# Patient Record
(demographics unavailable — no encounter records)

---

## 2025-03-17 NOTE — HISTORY OF PRESENT ILLNESS
[Sudden] : sudden [6] : 6 [2] : 2 [Throbbing] : throbbing [Squeezing] : squeezing [Tightness] : tightness [Household chores] : household chores [Leisure] : leisure [Work] : work [Standing] : standing [Walking] : walking [Exercising] : exercising [de-identified] : This is Ms. MARV ANDERS  a 62 year old female who comes in today complaining of right calf pain after playing pickleball on 03/13/25. Pt states putting weight on calf hurts but limping helps. [] : no [FreeTextEntry1] : right calf [FreeTextEntry3] : 03/13/25 [FreeTextEntry5] : Playing b-datum [FreeTextEntry9] : ibuprofen

## 2025-03-17 NOTE — IMAGING
[Right] : right tibia/fibula [There are no fractures, subluxations or dislocations. No significant abnormalities are seen] : There are no fractures, subluxations or dislocations. No significant abnormalities are seen [de-identified] :   ----------------------------------------------------------------------------   Right foot/ankle exam:  Inspection:   (neg) Effusion, + ecchymosis posterior calf/ ankle     Swelling:   (neg) Calf                        (neg) Lateral ankle        (neg) Medial ankle                          (neg) Lateral foot          (neg) Medial foot     (neg) Dorsal foot                       (neg) 1st MTP                (neg) Toes    Deformity:  (neg) Hindfoot varus     (neg) Valgus                      (neg) Pes planus            (neg) Pes cavus                      Other: Range of Motion:    DF: 15   PF: 40   Eversion: 30   Inversion: 30 Tenderness:    (+) Calf                                   (neg)) Tibia       (neg) Syndesmosis    (neg) ATFL/CFL    (neg) Deltoid ligament    (neg) Lateral mal                         (neg) Medial mal    (neg) Lateral joint line                  (neg) Medial joint line     (neg) Anterior ankle joint line    (neg) Achilles insertion               (neg) Midsubstance    (neg) Posterior Tibialias              (neg) Anterior Tibialis     (neg) Plantar fascia insertion      (neg) Midsubstance     (neg) Navicular tuberosity          (neg) Talonavicular          (neg) Calcaneocuboid    (neg) Metatarsals                        (neg)) TMT                      (neg) Lisfranc    (neg) 5th MT base             (neg) 1st MTP joint    (neg) Toes                       (neg) Sesamoids  Additional tests:    (neg) Anterior drawer    (neg) Ankle circumduction    (neg) Syndesmosis compression    (neg) Calcaneal compression    (neg) Foot squeeze    (+) Pain with single leg heel rise Strength:    DF: 5/5    PF: 5/5    Eversion: 5/5    Inversion: 5/5    EHL: 5/5    FHL: 5/5 Neuro: Sensation In tact to light touch in all distributions Vascularity: Extremity warm and well perfused Gait: normal

## 2025-03-17 NOTE — DISCUSSION/SUMMARY
[de-identified] : CAM boot fitted and dispensed  aleve otc  ice PT rx fu 6 wks   ----------------------------------------------------------------------------   All relevant imaging studies pertinent to today's visit, including x-rays, MRI's and/or other advanced imaging studies (CT/etc) were independently interpreted and reviewed with the patient as needed. Implications of the studies together with the patient's clinical picture were discussed to formulate a working diagnosis and management options were detailed.   The patient and/or guardian was advised of the diagnosis.  The natural history of the pathology was explained in full. All questions were answered.  The risks and benefits of conservative and interventional treatment alternatives were explained to the patient   The patient and/or guardian was advised if any advanced diagnostic/imaging study (MRI/CT/etc) is ordered to evaluate potential pathology in the affected area(s), they should follow up in the office to review the results of the study and determine further management that may be indicated.   ----------------------------------------------------------------------------   Patient warned of specific risks of medication related to bleeding, GI issues, increase blood pressure, and cardiac risks in addition to additional risks.  Patient advised to discuss with PMD  if any presence of stated issues.